# Patient Record
Sex: FEMALE | URBAN - METROPOLITAN AREA
[De-identification: names, ages, dates, MRNs, and addresses within clinical notes are randomized per-mention and may not be internally consistent; named-entity substitution may affect disease eponyms.]

---

## 2019-06-25 ENCOUNTER — OFFICE VISIT (OUTPATIENT)
Dept: URBAN - METROPOLITAN AREA CLINIC 11 | Facility: CLINIC | Age: 44
End: 2019-06-25
Payer: COMMERCIAL

## 2019-06-25 DIAGNOSIS — H52.4 PRESBYOPIA: Primary | ICD-10-CM

## 2019-06-25 DIAGNOSIS — H40.023 OPEN ANGLE WITH BORDERLINE FINDINGS, HIGH RISK, BILATERAL: ICD-10-CM

## 2019-06-25 PROCEDURE — 92002 INTRM OPH EXAM NEW PATIENT: CPT | Performed by: OPTOMETRIST

## 2019-06-25 ASSESSMENT — VISUAL ACUITY
OD: 20/400
OS: 20/40

## 2019-06-25 ASSESSMENT — INTRAOCULAR PRESSURE
OD: 23
OS: 26

## 2019-06-25 NOTE — IMPRESSION/PLAN
Impression: Open angle with borderline findings, high risk, bilateral: H40.023.  Plan: Refer to glaucoma specialist. RTC 1mn IOP DE OCT

## 2019-08-14 ENCOUNTER — OFFICE VISIT (OUTPATIENT)
Dept: URBAN - METROPOLITAN AREA CLINIC 11 | Facility: CLINIC | Age: 44
End: 2019-08-14
Payer: COMMERCIAL

## 2019-08-14 DIAGNOSIS — H47.313 COLOBOMA OF OPTIC DISC, BILATERAL: Primary | ICD-10-CM

## 2019-08-14 PROCEDURE — 92020 GONIOSCOPY: CPT | Performed by: OPHTHALMOLOGY

## 2019-08-14 PROCEDURE — 92133 CPTRZD OPH DX IMG PST SGM ON: CPT | Performed by: OPHTHALMOLOGY

## 2019-08-14 PROCEDURE — 76514 ECHO EXAM OF EYE THICKNESS: CPT | Performed by: OPHTHALMOLOGY

## 2019-08-14 PROCEDURE — 92004 COMPRE OPH EXAM NEW PT 1/>: CPT | Performed by: OPHTHALMOLOGY

## 2019-08-14 ASSESSMENT — INTRAOCULAR PRESSURE
OS: 25
OD: 25

## 2019-08-14 NOTE — IMPRESSION/PLAN
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 /560, 8/19 OCT 47/45 3/4 Plan: Discussed diagnosis with patient. Discussed treatment options. IOP to high for ON appearance. Start Lumigan QHS OU (sample dispensed to patient) will continue to monitor. 1 month HVF, Optos and IOP check Dr Macario Nicely.